# Patient Record
Sex: FEMALE | Race: WHITE | NOT HISPANIC OR LATINO | Employment: OTHER | URBAN - METROPOLITAN AREA
[De-identification: names, ages, dates, MRNs, and addresses within clinical notes are randomized per-mention and may not be internally consistent; named-entity substitution may affect disease eponyms.]

---

## 2022-02-14 ENCOUNTER — TELEPHONE (OUTPATIENT)
Dept: CARDIOLOGY | Facility: CLINIC | Age: 72
End: 2022-02-14
Payer: MEDICARE

## 2022-02-14 NOTE — TELEPHONE ENCOUNTER
Pt was just needing an appt within a few weeks. Pt said not severe pain but will go if it gets better.

## 2022-02-14 NOTE — TELEPHONE ENCOUNTER
----- Message from Aly Penaloza sent at 2/14/2022  8:54 AM CST -----  Contact: Self  Type:  Sooner Apoointment Request    Caller is requesting a sooner appointment.  Caller declined first available appointment listed below.  Caller will not accept being placed on the waitlist and is requesting a message be sent to doctor.    Name of Caller:  Patient  When is the first available appointment?  3/21  Symptoms:  states she is having issues   Best Call Back Number:  [phone]  Additional Information:  PT would not say she just states she is having issues and needs to be seen ASAP. She states she can not wait till the 21st. Please call pt back at [phone] to update and advise please.

## 2022-03-05 NOTE — PROGRESS NOTES
Subjective:    Patient ID:  Frances Hoskins is a 71 y.o. female who presents for follow-up of   Chief Complaint   Patient presents with    Follow-up       HPI:    Discharge date: 10/28/2020      History of MI STENT TIMES 8.Smoking 3 cigs a day. HTN, HLD, COPD.  History of Present Illness / Hospital Course    Frances Hoskins is a 70 y.o. female who presented on admission with unstable angina positive troponins EKG changes no coronary artery disease and COPD exacerbation. She is status post stent in the mid right coronary artery with no complication. She has moderate hypoxemia and wheezing on admission and she was seen by pulmonary who follow her as an outpatient and added Spiriva inhaler.  At the time of discharge she is in satisfactory condition her groins healing well.    Procedures Performed  Left heart catheterization  Eluting stent mid right coronary artery in-stent stenosis 99% lesion reduced to no residual.    3/7/22   Frances alberto presents today with onset of chest pain x3 weeks.  Her last hospitalization for heart was October 2020 when she had a 99% stenosis in the mid right coronary artery which was stented with a drug-eluting stent.  She was seen at [facility] Thursday the 5th.  For exertional chest pains that occur her 3 to 5 times a week which last about 3 minutes and seemed to be relieved by nitroglycerin.  The discomfort generally is relieved by stopping her activity such as sweeping the floor .  They put her on steroids for wheezing and COPD.  They asked to see cardiologist but she preferred to follow-up in our office.  Her troponins were slightly elevated but not critical.  She continues to have some wheezing she is not sure if it is her lungs or her heart.  Her EKG today shows sinus rhythm with no ischemic changes.  No old myocardial infarction.    She had 1 episode where she woke up from a sound sleep with chest discomfort relieved spontaneously.    She has a pulse oximeter at home and her oxygen  runs about 93%  Has history of bad GERD.      Review of patient's allergies indicates:   Allergen Reactions    Oxycodone-acetaminophen      Other reaction(s): Unknown       History reviewed. No pertinent past medical history.  History reviewed. No pertinent surgical history.  Social History     Tobacco Use    Smoking status: Current Every Day Smoker     Types: Cigarettes    Smokeless tobacco: Never Used   Substance Use Topics    Alcohol use: Not Currently     History reviewed. No pertinent family history.     Review of Systems:   Constitution: Negative for diaphoresis and fever.   HEENT: Negative for nosebleeds.    Cardiovascular: POS for chest pain       No dyspnea on exertion       No leg swelling        No palpitations  Respiratory: Negative for shortness of breath and wheezing.    Hematologic/Lymphatic: Negative for bleeding problem. Does not bruise/bleed easily.   Skin: Negative for color change and rash.   Musculoskeletal: Negative for falls and myalgias.   Gastrointestinal: Negative for hematemesis and hematochezia.   Genitourinary: Negative for hematuria.   Neurological: Negative for dizziness and light-headedness.   Psychiatric/Behavioral: Negative for altered mental status and memory loss.          Objective:        Vitals:    03/07/22 1245   BP: 126/76   Pulse: (!) 111   Resp: 16   Temp: 97 °F (36.1 °C)       No results found for: WBC, HGB, HCT, PLT, CHOL, TRIG, HDL, LDLDIRECT, ALT, AST, NA, K, CL, CREATININE, BUN, CO2, TSH, PSA, INR, GLUF, HGBA1C, MICROALBUR     ECHOCARDIOGRAM RESULTS  No results found for this or any previous visit.        CURRENT/PREVIOUS VISIT EKG  No results found for this or any previous visit.  No valid procedures specified.   No results found for this or any previous visit.      Physical Exam:  CONSTITUTIONAL: No fever, no chills  HEENT: Normocephalic, atraumatic,pupils reactive to light                 NECK:  No JVD no carotid bruit  CVS: S1S2+, RRR, no murmurs,   LUNGS:  Clear  ABDOMEN: Soft, NT, BS+  EXTREMITIES: No cyanosis, edema  : No hoang catheter  NEURO: AAO X 3  PSY: Normal affect                Assessment:       1. Atherosclerosis of native coronary artery of native heart with unstable angina pectoris    2. Primary hypertension    3. History of myocardial infarction    4. H/O heart artery stent    5. Obesity (BMI 30.0-34.9)    6. Mixed hyperlipidemia    7. Panlobular emphysema         Plan:     Problem List Items Addressed This Visit    None     Visit Diagnoses     Atherosclerosis of native coronary artery of native heart with unstable angina pectoris    -  Primary    Relevant Orders    Nuclear Stress - Cardiology Interpreted    Echo    Lipid Panel    TSH    Vitamin D    CBC Without Differential    Comprehensive Metabolic Panel    Hemoglobin A1C    Primary hypertension        Relevant Orders    IN OFFICE EKG 12-LEAD (to Muse)    Nuclear Stress - Cardiology Interpreted    Echo    Lipid Panel    TSH    Vitamin D    CBC Without Differential    Comprehensive Metabolic Panel    Hemoglobin A1C    History of myocardial infarction        Relevant Orders    Ambulatory referral/consult to Smoking Cessation Program    Nuclear Stress - Cardiology Interpreted    Echo    Lipid Panel    TSH    Vitamin D    CBC Without Differential    Comprehensive Metabolic Panel    Hemoglobin A1C    H/O heart artery stent        Relevant Orders    IN OFFICE EKG 12-LEAD (to Corinne)    Ambulatory referral/consult to Smoking Cessation Program    Nuclear Stress - Cardiology Interpreted    Echo    Lipid Panel    TSH    Vitamin D    CBC Without Differential    Comprehensive Metabolic Panel    Hemoglobin A1C    Obesity (BMI 30.0-34.9)        Relevant Orders    Lipid Panel    TSH    Vitamin D    CBC Without Differential    Comprehensive Metabolic Panel    Hemoglobin A1C    Mixed hyperlipidemia        Relevant Orders    Lipid Panel    TSH    Vitamin D    CBC Without Differential    Comprehensive Metabolic Panel     Hemoglobin A1C    Panlobular emphysema               SHE HAS A STRONG HISTORY OF HEART DISEASE IN HER SYMPTOMS ARE CONCERNING FOR unstable angina pectoris.  She does still have wheezing and COPD and relative hypoxemia so it could be her lungs and or reflux.  Offered her the option of having heart catheterization versus a stress testing.  Stress testing will be scheduled earliest [facility].  She continues to have symptoms recommend of the trip to the emergency room working fine of inpatient cardiology evaluation.    Follow up in about 2 years (around 3/7/2024).    The patients questions were answered, they verbalized understanding, and agreed with the treatment plan.     SASKIA GARCIA MD  [facility]

## 2022-03-07 ENCOUNTER — OFFICE VISIT (OUTPATIENT)
Dept: CARDIOLOGY | Facility: CLINIC | Age: 72
End: 2022-03-07
Payer: MEDICARE

## 2022-03-07 VITALS
RESPIRATION RATE: 16 BRPM | WEIGHT: 187 LBS | DIASTOLIC BLOOD PRESSURE: 76 MMHG | HEIGHT: 68 IN | OXYGEN SATURATION: 97 % | HEART RATE: 111 BPM | TEMPERATURE: 97 F | SYSTOLIC BLOOD PRESSURE: 126 MMHG | BODY MASS INDEX: 28.34 KG/M2

## 2022-03-07 DIAGNOSIS — Z95.5 H/O HEART ARTERY STENT: ICD-10-CM

## 2022-03-07 DIAGNOSIS — K21.9 GASTROESOPHAGEAL REFLUX DISEASE WITHOUT ESOPHAGITIS: ICD-10-CM

## 2022-03-07 DIAGNOSIS — E66.9 OBESITY (BMI 30.0-34.9): ICD-10-CM

## 2022-03-07 DIAGNOSIS — I25.2 HISTORY OF MYOCARDIAL INFARCTION: ICD-10-CM

## 2022-03-07 DIAGNOSIS — E78.2 MIXED HYPERLIPIDEMIA: ICD-10-CM

## 2022-03-07 DIAGNOSIS — I10 PRIMARY HYPERTENSION: ICD-10-CM

## 2022-03-07 DIAGNOSIS — J43.1 PANLOBULAR EMPHYSEMA: ICD-10-CM

## 2022-03-07 DIAGNOSIS — I25.110 ATHEROSCLEROSIS OF NATIVE CORONARY ARTERY OF NATIVE HEART WITH UNSTABLE ANGINA PECTORIS: Primary | ICD-10-CM

## 2022-03-07 PROCEDURE — 93000 ELECTROCARDIOGRAM COMPLETE: CPT | Mod: S$GLB,,, | Performed by: GENERAL PRACTICE

## 2022-03-07 PROCEDURE — 93000 EKG 12-LEAD: ICD-10-PCS | Mod: S$GLB,,, | Performed by: GENERAL PRACTICE

## 2022-03-07 PROCEDURE — 99205 OFFICE O/P NEW HI 60 MIN: CPT | Mod: S$GLB,,, | Performed by: GENERAL PRACTICE

## 2022-03-07 PROCEDURE — 99205 PR OFFICE/OUTPT VISIT, NEW, LEVL V, 60-74 MIN: ICD-10-PCS | Mod: S$GLB,,, | Performed by: GENERAL PRACTICE

## 2022-03-07 RX ORDER — LISINOPRIL 10 MG/1
10 TABLET ORAL DAILY
COMMUNITY
Start: 2022-03-03

## 2022-03-07 RX ORDER — OMEPRAZOLE 40 MG/1
40 CAPSULE, DELAYED RELEASE ORAL DAILY
COMMUNITY
Start: 2022-03-03

## 2022-03-07 RX ORDER — MIRTAZAPINE 30 MG/1
30 TABLET, FILM COATED ORAL NIGHTLY
COMMUNITY
Start: 2022-03-03

## 2022-03-07 RX ORDER — EZETIMIBE 10 MG/1
10 TABLET ORAL DAILY
COMMUNITY
Start: 2022-03-03

## 2022-03-07 RX ORDER — UMECLIDINIUM BROMIDE AND VILANTEROL TRIFENATATE 62.5; 25 UG/1; UG/1
1 POWDER RESPIRATORY (INHALATION) DAILY
COMMUNITY
Start: 2022-03-03

## 2022-03-07 RX ORDER — ALBUTEROL SULFATE 90 UG/1
AEROSOL, METERED RESPIRATORY (INHALATION)
COMMUNITY
Start: 2022-03-03 | End: 2023-03-03

## 2022-03-07 RX ORDER — NITROGLYCERIN 400 UG/1
SPRAY ORAL
COMMUNITY
Start: 2022-03-03 | End: 2023-03-02

## 2022-03-07 RX ORDER — IPRATROPIUM BROMIDE AND ALBUTEROL SULFATE 2.5; .5 MG/3ML; MG/3ML
SOLUTION RESPIRATORY (INHALATION)
COMMUNITY
Start: 2021-05-11 | End: 2022-05-10

## 2022-03-07 RX ORDER — ROSUVASTATIN CALCIUM 40 MG/1
40 TABLET, COATED ORAL DAILY
COMMUNITY
Start: 2022-03-03

## 2022-03-07 RX ORDER — PRASUGREL 10 MG/1
10 TABLET, FILM COATED ORAL DAILY
Qty: 30 TABLET | Refills: 11 | Status: SHIPPED | OUTPATIENT
Start: 2022-03-07 | End: 2023-03-07

## 2022-03-07 RX ORDER — ASPIRIN 81 MG/1
TABLET ORAL
COMMUNITY
Start: 2022-03-03